# Patient Record
Sex: MALE | Race: WHITE | NOT HISPANIC OR LATINO | Employment: FULL TIME | URBAN - METROPOLITAN AREA
[De-identification: names, ages, dates, MRNs, and addresses within clinical notes are randomized per-mention and may not be internally consistent; named-entity substitution may affect disease eponyms.]

---

## 2023-03-21 DIAGNOSIS — I77.810 DILATED AORTIC ROOT: ICD-10-CM

## 2023-03-21 DIAGNOSIS — Q23.1 BICUSPID AORTIC VALVE: Primary | ICD-10-CM

## 2023-03-22 DIAGNOSIS — I77.810 DILATED AORTIC ROOT: ICD-10-CM

## 2023-03-22 DIAGNOSIS — Q23.1 BICUSPID AORTIC VALVE: Primary | ICD-10-CM

## 2023-03-30 ENCOUNTER — DOCUMENTATION ONLY (OUTPATIENT)
Dept: PEDIATRIC CARDIOLOGY | Facility: CLINIC | Age: 48
End: 2023-03-30
Payer: COMMERCIAL

## 2023-03-30 NOTE — PROGRESS NOTES
Patient has been referred to me from Jimi Montenegro and Ang Garcia at [facility] in [place].  I have also been messages the patient.  I reviewed multiple test reports along with images from some of the studies.  I will see him in the near future.    A cardiac MRI was performed February 28, 2023 at [facility].  Official diagnoses:  1. Bicuspid aortic valve with mild aortic insufficiency, regurgitant fraction 8%   2. Mild mitral insufficiency, regurgitant fraction 8%   3. Normal left ventricular systolic function, ejection fraction 70%   4. Subjective mild wall thickening of the left ventricle, but none of the measurements meet MRI criteria for hypertrophic cardiomyopathy   5. No fibrosis or scar in the ventricle    6. 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  Aneurysm measures 4 x 3 x 3 cm with the neck of the aneurysm measuring 2.2 cm.  As noted below, I disagree with this reading.      A cardiac CTA was performed December 30, 2022 at [facility]:   1. No coronary artery disease noted.  2. Total coronary calcium score 54, noted to be between the 75th and 90th percentile for males his age.      A transthoracic echocardiogram performed December 21, 2022 at [facility] revealed:  1. Normal left ventricular function, ejection fraction 65%.  No evidence of diastolic dysfunction.  Asymmetric septal left ventricular hypertrophy, mild.    2. Mildly enlarged right ventricle with normal function.    3. Aortic root measures 4.2 cm.    4. Bicuspid aortic valve with mild calcification and no significant insufficiency.  Aortic valve area 2.7 centimeter squared.  Peak velocity across the valve 2.2 m/sec with mean gradient 10 mmHg.  5. Mild mitral annular calcification without significant insufficiency  6. No  evidence of pulmonary hypertension    No mention on this study of a ventricular septal defect or a sinus of Valsalva aneurysm.      I personally reviewed the CT and MRI images, and I also had them reviewed by our congenital cardiac radiologist and Congenital Cardiac surgery.  We all agree that this is not a sinus of Valsalva aneurysm but instead a perimembranous ventricular septal defect with aneurysmal closure.    I discussed with the patient, and he said he was diagnosed with a hole in a membrane in my heart that gave me a heart murmur.  Was followed as a child, but no intervention was necessary.

## 2023-05-01 NOTE — PROGRESS NOTES
2023    re:Morgan Peter  :1975    Uma Wall, NP  [address] [address]    Dr. Matthew Finn Dr. Peter Fail [facility]     Dear Doctors:    Morgan Peter is a 47 y.o. male seen in my [facility] clinic today for evaluation of congenital heart disease.  To summarize his diagnoses are as follow:  Bicuspid aortic valve  Trivial aortic insufficiency and no stenosis  Mild dilation of the ascending aorta at about 4.2 cm  Large perimembranous ventricular septal defect completely closed with aneurysmal tricuspid valve tissue with large aneurysms noted extending the right ventricle, not obstructing any significant blood flow  No residual ventricular septal defect    To summarize, my recommendations are as follows:  No need for SBE prophylaxis  I would recommend against power lifting.  Otherwise, at present I see no need for activity restriction.  Follow-up in a year with echo and EKG.  I can likely space his clinic visits to every few years at that point.  Would recommend repeat chest MRA or CT scan in about 2 years to reassess the aorta.  Continue current medications.  Close follow-up with his general cardiology and primary care teams regarding management of his hypertension and hyperlipidemia.  His children are going to be screened for bicuspid aortic valve.    Discussion:  1. He has a bicuspid aortic valve without stenosis and with only trivial aortic insufficiency.  My biggest long term concern regarding this is his dilated ascending aorta at about 4.2 cm.  We discussed the potential indication for surgery of an aorta greater than 5.5 cm.  I suspect that his aortic size is been stable for some time.  I would restrict him from power lifting, but otherwise see no need for activity restriction present.    2. I reviewed all of his imaging and the echocardiogram from today.  The aneurysm is clearly below the level of the aortic valve and represents  the aneurysmal tissue formation that often closes a perimembranous ventricular septal defect.  I am not concerned at all about this rupturing.    History of present illness:  The patient provided the history.  He is an excellent historian.  I also reviewed multiple testing results as noted below.  He is [place].  In about 1983 it was diagnosed with it perimembranous ventricular septal defect with aneurysmal harsh closure.  He had a catheterization in 1984 that showed no significant shunt and normal pulmonary arterial pressures.  He has never required cardiac intervention.  Is completely asymptomatic from a cardiovascular standpoint without chest pain, palpitations, syncope, near syncope, cyanosis, or edema.  He played rugby actively for about 27 years.  He is very involved in aerobic exercise including running and biking, and he also participates in CrossFit.      He is a marine , and he lives in [place].  He is from [facility].    I reviewed multiple test reports along with images from some of the studies.    A cardiac MRI was performed February 28, 2023 at [facility].  Official diagnoses:  1. Bicuspid aortic valve with mild aortic insufficiency, regurgitant fraction 8%   2. Mild mitral insufficiency, regurgitant fraction 8%   3. Normal left ventricular systolic function, ejection fraction 70%   4. Subjective mild wall thickening of the left ventricle, but none of the measurements meet MRI criteria for hypertrophic cardiomyopathy   5. No fibrosis or scar in the ventricle  6. 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  Aneurysm measures 4 x 3 x 3 cm with the neck of the aneurysm measuring 2.2 cm.  As noted below, I disagree with this reading.      A cardiac CTA was performed December 30, 2022 at [facility]:   1.  No coronary artery disease noted.  2. Total coronary calcium score 54, noted to be between the 75th and 90th percentile for males his age.       A transthoracic echocardiogram performed December 21, 2022 at [facility] [place] [facility] revealed:  1. Normal left ventricular function, ejection fraction 65%.  No evidence of diastolic dysfunction.  Asymmetric septal left ventricular hypertrophy, mild.    2. Mildly enlarged right ventricle with normal function.    3. Aortic root measures 4.2 cm.    4. Bicuspid aortic valve with mild calcification and no significant insufficiency.  Aortic valve area 2.7 centimeter squared.  Peak velocity across the valve 2.2 m/sec with mean gradient 10 mmHg.  5. Mild mitral annular calcification without significant insufficiency  6. No evidence of pulmonary hypertension    No mention on this study of a ventricular septal defect or a sinus of Valsalva aneurysm.      I personally reviewed the CT and MRI images, and I also had them reviewed by our congenital cardiac radiologist and Congenital Cardiac surgery.  We all agree that this is not a sinus of Valsalva aneurysm but instead a perimembranous ventricular septal defect with aneurysmal closure.     The review of systems is as noted above. It is otherwise negative for other symptoms related to the general, neurological, psychiatric, endocrine, gastrointestinal, genitourinary, respiratory, dermatologic, musculoskeletal, hematologic, and immunologic systems.    Past Medical History:   Diagnosis Date    Chest discomfort     History of 2019 novel coronavirus disease (COVID-19) 02/2022    Hypertension     Thyroid disease      Past Surgical History:   Procedure Laterality Date    WISDOM TOOTH EXTRACTION       Family History   Problem Relation Age of Onset    Other Mother         COVID    Other Father         COVID    Hypertension Father      Social History     Socioeconomic History    Marital status:    Tobacco Use    Smoking  "status: Former     Types: Cigarettes     Quit date:      Years since quittin.3    Smokeless tobacco: Never   Substance and Sexual Activity    Alcohol use: Yes     Comment: BEER OCCASSIONALLY    Drug use: Never     Current Outpatient Medications on File Prior to Visit   Medication Sig Dispense Refill    levothyroxine (SYNTHROID) 100 MCG tablet Take 100 mcg by mouth before breakfast.      lisinopriL-hydrochlorothiazide (PRINZIDE,ZESTORETIC) 10-12.5 mg per tablet Take 1 tablet by mouth once daily.      cholecalciferol, vitamin D3, (VITAMIN D3) 25 mcg (1,000 unit) capsule Take 1,000 Units by mouth once daily.      nitroGLYCERIN (NITROSTAT) 0.4 MG SL tablet Place 0.4 mg under the tongue every 5 (five) minutes as needed.       No current facility-administered medications on file prior to visit.     Review of patient's allergies indicates:  No Known Allergies     BP (!) 141/91 (BP Location: Right arm, Patient Position: Sitting)   Pulse (!) 50   Ht 5' 11.93" (1.827 m)   Wt 81.4 kg (179 lb 7.3 oz)   SpO2 100%   BMI 24.39 kg/m²     Wt Readings from Last 3 Encounters:   23 81.4 kg (179 lb 7.3 oz)   22 86.2 kg (190 lb)     Ht Readings from Last 3 Encounters:   23 5' 11.93" (1.827 m)   22 6' (1.829 m)     Body mass index is 24.39 kg/m².  Facility age limit for growth percentiles is 20 years.  Facility age limit for growth percentiles is 20 years.  Facility age limit for growth percentiles is 20 years.    In general, he is a very healthy-appearing nondysmorphic male in no apparent distress.  The eyes, nares, and oropharynx are clear.  Eyelids and conjunctiva are normal without drainage or erythema.  Pupils equal and round bilaterally.  The head is normocephalic and atraumatic.  The neck is supple without jugular venous distention or thyroid enlargement.  The lungs are clear to auscultation bilaterally.  There are no scars on the chest wall.  The first and second heart sounds are normal.  " There are no murmurs, gallops, rubs, or clicks in the seated position.  The abdominal exam is benign without hepatosplenomegaly, tenderness, or distention.  Pulses are normal in all 4 extremities with brisk capillary refill and no clubbing, cyanosis, or edema.  No rashes are noted.    I personally reviewed the following tests performed today and my interpretation follows:    Echo today:  Large, anteriorly malaligned perimembranous VSD that is occluded by accessory tissue, no noted shunt. Normal aortic valve annulus. Bicuspid aortic valve. Normal aortic valve velocity. Trivial aortic valve insufficiency. No aortic root dilatation. The ascending aorta is not optimally seen for measurements. No evidence of arch obstruction in limited suprasternal notch images. Normal right and left ventricle structure and size. Normal right ventricular systolic function.    EKG - sinus bradycardia with right  bundle branch block.     Thank you for referring this patient to our clinic.  Please call with any questions.    Sincerely,        Chu Griggs MD  Pediatric Cardiology  Adult Congenital Heart Disease  Pediatric Heart Failure and Transplantation  [facility] [address] [place]  [address]  [phone]

## 2023-05-02 ENCOUNTER — HOSPITAL ENCOUNTER (OUTPATIENT)
Dept: PEDIATRIC CARDIOLOGY | Facility: HOSPITAL | Age: 48
Discharge: HOME | End: 2023-05-02
Attending: PEDIATRICS
Payer: COMMERCIAL

## 2023-05-02 ENCOUNTER — CLINICAL SUPPORT (OUTPATIENT)
Dept: PEDIATRIC CARDIOLOGY | Facility: CLINIC | Age: 48
End: 2023-05-02
Attending: PEDIATRICS
Payer: COMMERCIAL

## 2023-05-02 VITALS
HEIGHT: 72 IN | HEART RATE: 50 BPM | OXYGEN SATURATION: 100 % | WEIGHT: 179.44 LBS | BODY MASS INDEX: 24.3 KG/M2 | SYSTOLIC BLOOD PRESSURE: 141 MMHG | DIASTOLIC BLOOD PRESSURE: 91 MMHG

## 2023-05-02 DIAGNOSIS — Z87.74 HISTORY OF VENTRICULAR SEPTAL DEFECT: ICD-10-CM

## 2023-05-02 DIAGNOSIS — Q23.1 BICUSPID AORTIC VALVE: ICD-10-CM

## 2023-05-02 DIAGNOSIS — Q24.9 ADULT CONGENITAL HEART DISEASE: ICD-10-CM

## 2023-05-02 DIAGNOSIS — I77.810 DILATED AORTIC ROOT: ICD-10-CM

## 2023-05-02 PROBLEM — Q23.81 BICUSPID AORTIC VALVE: Status: ACTIVE | Noted: 2023-05-02

## 2023-05-02 PROCEDURE — 4010F ACE/ARB THERAPY RXD/TAKEN: CPT | Mod: CPTII,S$GLB,, | Performed by: PEDIATRICS

## 2023-05-02 PROCEDURE — 3080F PR MOST RECENT DIASTOLIC BLOOD PRESSURE >= 90 MM HG: ICD-10-PCS | Mod: CPTII,S$GLB,, | Performed by: PEDIATRICS

## 2023-05-02 PROCEDURE — 3080F DIAST BP >= 90 MM HG: CPT | Mod: CPTII,S$GLB,, | Performed by: PEDIATRICS

## 2023-05-02 PROCEDURE — 1159F MED LIST DOCD IN RCRD: CPT | Mod: CPTII,S$GLB,, | Performed by: PEDIATRICS

## 2023-05-02 PROCEDURE — 93000 ELECTROCARDIOGRAM COMPLETE: CPT | Mod: S$GLB,,, | Performed by: PEDIATRICS

## 2023-05-02 PROCEDURE — 99999 PR PBB SHADOW E&M-EST. PATIENT-LVL III: CPT | Mod: PBBFAC,,, | Performed by: PEDIATRICS

## 2023-05-02 PROCEDURE — 99205 OFFICE O/P NEW HI 60 MIN: CPT | Mod: 25,S$GLB,, | Performed by: PEDIATRICS

## 2023-05-02 PROCEDURE — 1159F PR MEDICATION LIST DOCUMENTED IN MEDICAL RECORD: ICD-10-PCS | Mod: CPTII,S$GLB,, | Performed by: PEDIATRICS

## 2023-05-02 PROCEDURE — 3077F PR MOST RECENT SYSTOLIC BLOOD PRESSURE >= 140 MM HG: ICD-10-PCS | Mod: CPTII,S$GLB,, | Performed by: PEDIATRICS

## 2023-05-02 PROCEDURE — 3008F BODY MASS INDEX DOCD: CPT | Mod: CPTII,S$GLB,, | Performed by: PEDIATRICS

## 2023-05-02 PROCEDURE — 99999 PR PBB SHADOW E&M-EST. PATIENT-LVL I: ICD-10-PCS | Mod: PBBFAC,,,

## 2023-05-02 PROCEDURE — 1160F PR REVIEW ALL MEDS BY PRESCRIBER/CLIN PHARMACIST DOCUMENTED: ICD-10-PCS | Mod: CPTII,S$GLB,, | Performed by: PEDIATRICS

## 2023-05-02 PROCEDURE — 99999 PR PBB SHADOW E&M-EST. PATIENT-LVL III: ICD-10-PCS | Mod: PBBFAC,,, | Performed by: PEDIATRICS

## 2023-05-02 PROCEDURE — 99205 PR OFFICE/OUTPT VISIT, NEW, LEVL V, 60-74 MIN: ICD-10-PCS | Mod: 25,S$GLB,, | Performed by: PEDIATRICS

## 2023-05-02 PROCEDURE — 4010F PR ACE/ARB THEARPY RXD/TAKEN: ICD-10-PCS | Mod: CPTII,S$GLB,, | Performed by: PEDIATRICS

## 2023-05-02 PROCEDURE — 3077F SYST BP >= 140 MM HG: CPT | Mod: CPTII,S$GLB,, | Performed by: PEDIATRICS

## 2023-05-02 PROCEDURE — 99999 PR PBB SHADOW E&M-EST. PATIENT-LVL I: CPT | Mod: PBBFAC,,,

## 2023-05-02 PROCEDURE — 93000 EKG 12-LEAD PEDIATRIC: ICD-10-PCS | Mod: S$GLB,,, | Performed by: PEDIATRICS

## 2023-05-02 PROCEDURE — 1160F RVW MEDS BY RX/DR IN RCRD: CPT | Mod: CPTII,S$GLB,, | Performed by: PEDIATRICS

## 2023-05-02 PROCEDURE — 3008F PR BODY MASS INDEX (BMI) DOCUMENTED: ICD-10-PCS | Mod: CPTII,S$GLB,, | Performed by: PEDIATRICS

## 2024-02-15 ENCOUNTER — PATIENT MESSAGE (OUTPATIENT)
Dept: CARDIOLOGY | Facility: CLINIC | Age: 49
End: 2024-02-15
Payer: COMMERCIAL

## 2024-02-15 DIAGNOSIS — Q24.9 ADULT CONGENITAL HEART DISEASE: Primary | ICD-10-CM

## 2024-02-15 DIAGNOSIS — Z87.74 HISTORY OF VENTRICULAR SEPTAL DEFECT: ICD-10-CM

## 2024-02-15 DIAGNOSIS — Q23.1 BICUSPID AORTIC VALVE: ICD-10-CM

## 2024-04-23 ENCOUNTER — PATIENT MESSAGE (OUTPATIENT)
Dept: CARDIOLOGY | Facility: CLINIC | Age: 49
End: 2024-04-23
Payer: COMMERCIAL

## 2025-05-25 ENCOUNTER — DOCUMENTATION ONLY (OUTPATIENT)
Dept: PEDIATRIC CARDIOLOGY | Facility: CLINIC | Age: 50
End: 2025-05-25
Payer: COMMERCIAL

## 2025-05-25 NOTE — PROGRESS NOTES
I reviewed recent records from [facility] [place] [facility].  Patient was seen there April 2025.  Blood pressure 121/70.  At that time, he was on levothyroxine, lisinopril 20 mg/hydrochlorothiazide 25 mg tablet daily, rosuvastatin 20 mg daily.    Blood work was performed April 12, 2025.  Lipoprotein a normal at 20.5 with 75 the upper limit of normal, hemoglobin A1c normal at 5, total cholesterol 163, HDL 94, LDL 59.  Comprehensive metabolic panel normal with creatinine 0.9.  CBC normal.    Transthoracic echo performed April 29, 2025 with normal left ventricular systolic and diastolic function, ejection fraction 60%.  Mild concentric left ventricular hypertrophy.  Normal global longitudinal strain.  Aortic root mildly enlarged at 4.1 cm with ascending aorta 3.6 cm.  Mildly calcified aortic valve with peak velocity 2.3 m/sec, mean gradient 11 mmHg.  Trace mitral insufficiency.    Patient had a CT angiogram of the chest May 30, 2025.  Of note, a 6.3 cm cyst was noted on the left kidney.  Mild coronary artery calcium noted in the left anterior descending.  Coronaries otherwise normal.  Ascending aorta 38 mm.  Large sinus of Valsalva aneurysm extending into the perimembranous septum measuring approximately 3.6 x 2.6 cm.    EKG with a right bundle branch block.